# Patient Record
Sex: FEMALE | Race: WHITE | NOT HISPANIC OR LATINO | Employment: OTHER | URBAN - METROPOLITAN AREA
[De-identification: names, ages, dates, MRNs, and addresses within clinical notes are randomized per-mention and may not be internally consistent; named-entity substitution may affect disease eponyms.]

---

## 2017-10-02 NOTE — PATIENT DISCUSSION
AMD (DRY), OS__:  PRESCRIBE AREDS 2 VITAMINS / AMSLER GRID QD/ UV PROTECTION. SMOKING CESSATION EMPHASIZED. RETURN FOR FOLLOW-UP AS SCHEDULED.

## 2020-06-02 NOTE — PATIENT DISCUSSION
PUNCTATE STAINING, OD; RX LOTEMAX TID. RX EMYCIN СВЕТЛАНА QHS. PT EDUCATION GIVEN. CONTINUE TO MONITOR. RETURN FOR FOLLOW UP AS SCHEDULED.

## 2020-06-02 NOTE — PATIENT DISCUSSION
New Prescription: Lotemax SM (loteprednol etabonate): drops,gel: 0.38% 1 drop three times a day into affected eye 06-

## 2020-06-22 NOTE — PATIENT DISCUSSION
Continue: FML Liquifilm (fluorometholone): drops,suspension: 0.1% 1 drop three times a day as directed into affected eye 06-

## 2020-06-22 NOTE — PATIENT DISCUSSION
PUNCTATE STAINING, OD; IMPROVED D/C LOTEMAX. CONTINUE ART TEARS PRN, CONTINUE EMYCIN СВЕТЛАНА QHS. PT EDUCATION GIVEN. CONTINUE TO MONITOR. RETURN FOR FOLLOW UP AS SCHEDULED.

## 2020-06-22 NOTE — PATIENT DISCUSSION
Continue: Lotemax SM (loteprednol etabonate): drops,gel: 0.38% 1 drop three times a day into affected eye 06-

## 2021-06-16 NOTE — PATIENT DISCUSSION
New Prescription: Maxitrol (neomycin-polymyxin-dexameth): ointment: 3.5-10,000-0.1 mg-unit/g-% a small amount at bedtime on eyelid 06-

## 2021-06-16 NOTE — PATIENT DISCUSSION
AMD (DRY DRUSEN), OS___:  RECOMMEND FOLLOW UP ON A YEARLY BASIS AND DAILY UV PROTECTION. RETURN FOR FOLLOW-UP AS SCHEDULED.

## 2022-03-02 NOTE — PATIENT DISCUSSION
"ARMD (Dry) Counseling: I have instructed the patient to take an AREDS 2 vitamin mixture to minimize the risk of developing ""wet"" macular degeneration disease progression. The importance of daily monitoring with Amsler grid was emphasized and an amsler grid was provided if the patient did not have one. The patient was advised to call the office if new symptoms of persistent blurring or distortion of vision arises as evaluation and possible treatment is necessary to preserve as much vision as possible. Return for follow-up as scheduled. "
AMD (DRY), OS:  PRESCRIBE AREDS 2 VITAMINS / AMSLER GRID QD/ UV PROTECTION. SMOKING CESSATION EMPHASIZED. RETURN FOR FOLLOW-UP AS SCHEDULED.
COUNSELING:
EPIRETINAL MEMBRANE, OD. NOT VISUALLY SIGNIFICANT TO THE PATIENT AT THIS TIME. FOLLOW.
Epiretinal Membrane Counseling: The diagnosis and natural history of epiretinal membrane was discussed with the patient including the risk of progression with retinal traction resulting in visual distortion. The patient is instructed to call back immediately if any vision changes, and keep follow up as scheduled.
General:
SUBCONJUNCTIVAL HEMORRHAGE, OS: NO TREATMENT INDICATED. REASSURANCE GIVEN.
Subconjunctival Hemorrhage Counseling:  I have explained that this is a temporary condition that will resolve spontaneously within a week or two. I have reassured the patient that this is a common occurrence often associated with dry eye, straining, or systemic blood thinners. I have explained to the patient that if these become recurrent or excessive, they should return to the office for  a medical workup, assessment of risk factors, and possible appropriate laboratory studies, sometimes in collaboration with the patient's primary care physician.
In my judgment no risk for PPH has been identified at this time.

## 2022-07-11 ENCOUNTER — NEW PATIENT (OUTPATIENT)
Dept: URBAN - METROPOLITAN AREA CLINIC 40 | Facility: CLINIC | Age: 62
End: 2022-07-11

## 2022-07-11 DIAGNOSIS — H04.123: ICD-10-CM

## 2022-07-11 DIAGNOSIS — H52.203: ICD-10-CM

## 2022-07-11 DIAGNOSIS — H25.813: ICD-10-CM

## 2022-07-11 DIAGNOSIS — H52.03: ICD-10-CM

## 2022-07-11 DIAGNOSIS — H52.4: ICD-10-CM

## 2022-07-11 PROCEDURE — 92004 COMPRE OPH EXAM NEW PT 1/>: CPT

## 2022-07-11 PROCEDURE — 92015 DETERMINE REFRACTIVE STATE: CPT

## 2022-07-11 ASSESSMENT — VISUAL ACUITY
OD_SC: <J10
OU_SC: 20/30
OU_CC: J1
OD_SC: 20/80-1
OS_SC: J10
OU_SC: J8
OS_SC: 20/30-1
OD_CC: J3
OS_CC: J2

## 2022-07-11 ASSESSMENT — TONOMETRY
OD_IOP_MMHG: 16
OS_IOP_MMHG: 16

## 2022-08-02 ENCOUNTER — CONSULTATION/EVALUATION (OUTPATIENT)
Dept: URBAN - METROPOLITAN AREA CLINIC 43 | Facility: CLINIC | Age: 62
End: 2022-08-02

## 2022-08-02 DIAGNOSIS — H04.123: ICD-10-CM

## 2022-08-02 DIAGNOSIS — H43.811: ICD-10-CM

## 2022-08-02 DIAGNOSIS — H52.7: ICD-10-CM

## 2022-08-02 PROCEDURE — 99499RLE REFRACTIVE CONSULT/RLE

## 2022-08-02 PROCEDURE — 92286 ANT SGM IMG I&R SPECLR MIC: CPT

## 2022-08-02 PROCEDURE — 92134 CPTRZ OPH DX IMG PST SGM RTA: CPT

## 2022-08-02 PROCEDURE — 92250 FUNDUS PHOTOGRAPHY W/I&R: CPT

## 2022-08-02 PROCEDURE — 92136TC INTERFEROMETRY - TECHNICAL COMPONENT

## 2022-08-02 PROCEDURE — 92025NC COMP. CORNEAL TOPO, UNI OR BILAT,

## 2022-08-02 PROCEDURE — V2799PMN IMPRIMIS PRED-MOXI-NEPAF 5ML

## 2022-08-02 RX ORDER — LOTEPREDNOL ETABONATE 3.8 MG/G: 1 GEL OPHTHALMIC

## 2022-08-02 RX ORDER — CYCLOSPORINE 0 MG/ML: 1 SOLUTION/ DROPS OPHTHALMIC; TOPICAL TWICE A DAY

## 2022-08-02 ASSESSMENT — TONOMETRY
OS_IOP_MMHG: 13
OD_IOP_MMHG: 13

## 2022-08-02 ASSESSMENT — VISUAL ACUITY
OS_CC: J1
OS_SC: 20/40
OD_CC: J10
OD_SC: 20/200

## 2023-01-20 ENCOUNTER — FOLLOW UP (OUTPATIENT)
Dept: URBAN - METROPOLITAN AREA CLINIC 39 | Facility: CLINIC | Age: 63
End: 2023-01-20

## 2023-01-20 DIAGNOSIS — H52.7: ICD-10-CM

## 2023-01-20 DIAGNOSIS — H04.123: ICD-10-CM

## 2023-01-20 DIAGNOSIS — H25.093: ICD-10-CM

## 2023-01-20 PROCEDURE — 92012 INTRM OPH EXAM EST PATIENT: CPT

## 2023-01-20 PROCEDURE — 92025 CPTRIZED CORNEAL TOPOGRAPHY: CPT

## 2023-01-20 PROCEDURE — 92250 FUNDUS PHOTOGRAPHY W/I&R: CPT

## 2023-01-20 ASSESSMENT — VISUAL ACUITY
OS_SC: 20/25-2
OD_SC: 20/80-1

## 2023-01-27 ENCOUNTER — CLINIC PROCEDURE ONLY (OUTPATIENT)
Dept: URBAN - METROPOLITAN AREA CLINIC 39 | Facility: CLINIC | Age: 63
End: 2023-01-27

## 2023-01-27 DIAGNOSIS — H04.123: ICD-10-CM

## 2023-01-27 PROCEDURE — 99199ST SERUM TEARS

## 2023-05-26 ENCOUNTER — FOLLOW UP (OUTPATIENT)
Dept: URBAN - METROPOLITAN AREA CLINIC 39 | Facility: CLINIC | Age: 63
End: 2023-05-26

## 2025-04-18 ENCOUNTER — COMPREHENSIVE EXAM (OUTPATIENT)
Age: 65
End: 2025-04-18

## 2025-04-18 DIAGNOSIS — H52.03: ICD-10-CM

## 2025-04-18 DIAGNOSIS — H52.4: ICD-10-CM

## 2025-04-18 DIAGNOSIS — H52.7: ICD-10-CM

## 2025-04-18 DIAGNOSIS — H25.093: ICD-10-CM

## 2025-04-18 DIAGNOSIS — H04.123: ICD-10-CM

## 2025-04-18 DIAGNOSIS — H52.203: ICD-10-CM

## 2025-04-18 DIAGNOSIS — H43.811: ICD-10-CM

## 2025-04-18 PROCEDURE — 92014 COMPRE OPH EXAM EST PT 1/>: CPT

## 2025-04-18 PROCEDURE — 92015 DETERMINE REFRACTIVE STATE: CPT
